# Patient Record
Sex: FEMALE | Race: BLACK OR AFRICAN AMERICAN | Employment: UNEMPLOYED | URBAN - METROPOLITAN AREA
[De-identification: names, ages, dates, MRNs, and addresses within clinical notes are randomized per-mention and may not be internally consistent; named-entity substitution may affect disease eponyms.]

---

## 2017-01-13 ENCOUNTER — ED HISTORICAL/CONVERTED ENCOUNTER (OUTPATIENT)
Dept: OTHER | Age: 32
End: 2017-01-13

## 2017-08-19 ENCOUNTER — ED HISTORICAL/CONVERTED ENCOUNTER (OUTPATIENT)
Dept: OTHER | Age: 32
End: 2017-08-19

## 2019-05-29 LAB — PAP SMEAR, EXTERNAL: NEGATIVE

## 2019-06-11 ENCOUNTER — OP HISTORICAL/CONVERTED ENCOUNTER (OUTPATIENT)
Dept: OTHER | Age: 34
End: 2019-06-11

## 2019-11-20 ENCOUNTER — OP HISTORICAL/CONVERTED ENCOUNTER (OUTPATIENT)
Dept: OTHER | Age: 34
End: 2019-11-20

## 2019-12-10 ENCOUNTER — ED HISTORICAL/CONVERTED ENCOUNTER (OUTPATIENT)
Dept: OTHER | Age: 34
End: 2019-12-10

## 2019-12-26 ENCOUNTER — IP HISTORICAL/CONVERTED ENCOUNTER (OUTPATIENT)
Dept: OTHER | Age: 34
End: 2019-12-26

## 2020-01-02 ENCOUNTER — OP HISTORICAL/CONVERTED ENCOUNTER (OUTPATIENT)
Dept: OTHER | Age: 35
End: 2020-01-02

## 2020-12-07 VITALS
HEIGHT: 71 IN | DIASTOLIC BLOOD PRESSURE: 96 MMHG | HEART RATE: 80 BPM | BODY MASS INDEX: 41.02 KG/M2 | WEIGHT: 293 LBS | SYSTOLIC BLOOD PRESSURE: 160 MMHG

## 2020-12-07 PROBLEM — A49.8 BACTERIAL INFECTION DUE TO PROTEUS MIRABILIS: Status: ACTIVE | Noted: 2020-12-07

## 2020-12-07 PROBLEM — R51.9 HEADACHE: Status: ACTIVE | Noted: 2020-12-07

## 2020-12-07 PROBLEM — Z98.891 HISTORY OF CESAREAN SECTION: Status: ACTIVE | Noted: 2020-12-07

## 2020-12-07 PROBLEM — I10 BENIGN ESSENTIAL HYPERTENSION: Status: ACTIVE | Noted: 2020-12-07

## 2020-12-07 PROBLEM — G43.909 MIGRAINE: Status: ACTIVE | Noted: 2020-12-07

## 2020-12-07 PROBLEM — E66.01 MORBID OBESITY (HCC): Status: ACTIVE | Noted: 2020-12-07

## 2020-12-07 PROBLEM — A49.9 BACTERIAL INFECTIOUS DISEASE: Status: ACTIVE | Noted: 2020-12-07

## 2020-12-07 PROBLEM — G89.29 CHRONIC BACK PAIN: Status: ACTIVE | Noted: 2020-12-07

## 2020-12-07 PROBLEM — F17.200 TOBACCO DEPENDENCE SYNDROME: Status: ACTIVE | Noted: 2020-12-07

## 2020-12-07 PROBLEM — Z72.0 TOBACCO USER: Status: ACTIVE | Noted: 2020-12-07

## 2020-12-07 PROBLEM — M54.9 CHRONIC BACK PAIN: Status: ACTIVE | Noted: 2020-12-07

## 2020-12-07 PROBLEM — I15.2 HYPERTENSION DUE TO ENDOCRINE DISORDER: Status: ACTIVE | Noted: 2020-12-07

## 2020-12-07 PROBLEM — M54.50 LOW BACK PAIN: Status: ACTIVE | Noted: 2020-12-07

## 2020-12-07 PROBLEM — L03.90 CELLULITIS: Status: ACTIVE | Noted: 2020-12-07

## 2020-12-07 PROBLEM — B37.31 CANDIDIASIS OF VAGINA: Status: ACTIVE | Noted: 2020-12-07

## 2020-12-07 PROBLEM — J32.9 SINUSITIS: Status: ACTIVE | Noted: 2020-12-07

## 2020-12-07 PROBLEM — T81.32XA DEHISCENCE OF INTERNAL SURGICAL WOUND: Status: ACTIVE | Noted: 2020-12-07

## 2020-12-07 PROBLEM — R25.2 CRAMP AND SPASM: Status: ACTIVE | Noted: 2020-12-07

## 2020-12-07 PROBLEM — T81.40XA POSTOPERATIVE INFECTION: Status: ACTIVE | Noted: 2020-12-07

## 2020-12-07 PROBLEM — A49.8 INFECTION DUE TO ESCHERICHIA COLI: Status: ACTIVE | Noted: 2020-12-07

## 2020-12-07 PROBLEM — G56.03 CARPAL TUNNEL SYNDROME, BILATERAL: Status: ACTIVE | Noted: 2020-12-07

## 2020-12-07 RX ORDER — IBUPROFEN 800 MG/1
TABLET ORAL
COMMUNITY

## 2020-12-07 RX ORDER — FLUCONAZOLE 150 MG/1
150 TABLET ORAL DAILY
COMMUNITY
End: 2021-05-03 | Stop reason: SDDI

## 2020-12-07 RX ORDER — CETIRIZINE HCL 10 MG
TABLET ORAL
COMMUNITY

## 2020-12-07 RX ORDER — B-COMPLEX WITH VITAMIN C
1 TABLET ORAL DAILY
COMMUNITY
End: 2021-05-03 | Stop reason: SDDI

## 2020-12-07 RX ORDER — ASPIRIN 81 MG/1
TABLET ORAL DAILY
COMMUNITY
End: 2021-05-03 | Stop reason: SDDI

## 2020-12-07 RX ORDER — TELMISARTAN 40 MG/1
40 TABLET ORAL DAILY
COMMUNITY
End: 2021-05-03 | Stop reason: SDDI

## 2020-12-07 RX ORDER — DOCUSATE SODIUM 100 MG/1
100 CAPSULE, LIQUID FILLED ORAL 2 TIMES DAILY
COMMUNITY

## 2020-12-07 RX ORDER — AMOXICILLIN 500 MG/1
500 CAPSULE ORAL
COMMUNITY
End: 2021-05-03 | Stop reason: ALTCHOICE

## 2020-12-07 RX ORDER — FLUTICASONE PROPIONATE 50 MCG
2 SPRAY, SUSPENSION (ML) NASAL DAILY
COMMUNITY
End: 2021-05-03 | Stop reason: ALTCHOICE

## 2020-12-07 RX ORDER — TELMISARTAN AND HYDROCHLORTHIAZIDE 40; 12.5 MG/1; MG/1
1 TABLET ORAL DAILY
COMMUNITY
End: 2021-05-03 | Stop reason: SDDI

## 2020-12-07 RX ORDER — SULFAMETHOXAZOLE AND TRIMETHOPRIM 800; 160 MG/1; MG/1
1 TABLET ORAL 2 TIMES DAILY
COMMUNITY
End: 2021-05-03 | Stop reason: ALTCHOICE

## 2020-12-07 RX ORDER — LANOLIN ALCOHOL/MO/W.PET/CERES
CREAM (GRAM) TOPICAL
COMMUNITY
End: 2021-05-03 | Stop reason: SDDI

## 2020-12-07 RX ORDER — PROMETHAZINE HYDROCHLORIDE 12.5 MG/1
TABLET ORAL
COMMUNITY
End: 2021-05-03 | Stop reason: SDDI

## 2020-12-07 RX ORDER — AMOXICILLIN AND CLAVULANATE POTASSIUM 875; 125 MG/1; MG/1
TABLET, FILM COATED ORAL EVERY 12 HOURS
COMMUNITY
End: 2021-05-03 | Stop reason: ALTCHOICE

## 2020-12-07 RX ORDER — NIFEDIPINE 30 MG/1
TABLET, EXTENDED RELEASE ORAL DAILY
COMMUNITY
End: 2021-05-03 | Stop reason: SDDI

## 2020-12-07 RX ORDER — LOSARTAN POTASSIUM AND HYDROCHLOROTHIAZIDE 12.5; 1 MG/1; MG/1
1 TABLET ORAL DAILY
COMMUNITY

## 2020-12-07 RX ORDER — SODIUM CHLORIDE 0.65 %
1 AEROSOL, SPRAY (ML) NASAL AS NEEDED
COMMUNITY
End: 2021-05-03 | Stop reason: SDDI

## 2020-12-07 RX ORDER — TRIAMCINOLONE ACETONIDE 1 MG/G
CREAM TOPICAL 2 TIMES DAILY
COMMUNITY

## 2020-12-07 RX ORDER — HYDROCHLOROTHIAZIDE 12.5 MG/1
12.5 CAPSULE ORAL DAILY
COMMUNITY
End: 2021-05-03 | Stop reason: SDDI

## 2021-05-03 ENCOUNTER — VIRTUAL VISIT (OUTPATIENT)
Dept: FAMILY MEDICINE CLINIC | Age: 36
End: 2021-05-03
Payer: MEDICAID

## 2021-05-03 DIAGNOSIS — Z20.822 ENCOUNTER BY TELEHEALTH FOR SUSPECTED COVID-19: Primary | ICD-10-CM

## 2021-05-03 DIAGNOSIS — R11.2 NON-INTRACTABLE VOMITING WITH NAUSEA, UNSPECIFIED VOMITING TYPE: ICD-10-CM

## 2021-05-03 PROBLEM — B37.31 CANDIDIASIS OF VAGINA: Status: RESOLVED | Noted: 2020-12-07 | Resolved: 2021-05-03

## 2021-05-03 PROBLEM — A49.8 BACTERIAL INFECTION DUE TO PROTEUS MIRABILIS: Status: RESOLVED | Noted: 2020-12-07 | Resolved: 2021-05-03

## 2021-05-03 PROCEDURE — 99214 OFFICE O/P EST MOD 30 MIN: CPT | Performed by: NURSE PRACTITIONER

## 2021-05-03 RX ORDER — ONDANSETRON 4 MG/1
4 TABLET, ORALLY DISINTEGRATING ORAL
Qty: 21 TAB | Refills: 0 | Status: SHIPPED | OUTPATIENT
Start: 2021-05-03 | End: 2021-05-10

## 2021-05-03 NOTE — PROGRESS NOTES
Vidal Stewart (: 1985) is a 38 y.o. female, established patient, here for evaluation of the following chief complaint(s):   Chills, Fever, Nausea, Vomiting, and Generalized Body Aches       ASSESSMENT/PLAN:  Below is the assessment and plan developed based on review of pertinent labs, studies, and medications. 1. Encounter by telehealth for suspected COVID-19    No follow-ups on file. 1. Encounter by telehealth for suspected COVID-19  Patient advised that even if COVID-19 testing comes back negative, based on her symptoms she should continue to quarantine at home and consider retesting in a day or 2. She will need to have 3 consecutive days without a fever without use of medication also to come out of quarantine. Advised to go to the emergency room ASAP if her shortness of breath increases. Advised to get as much rest as possible take Tylenol Extra Strength or ibuprofen for aches and pains and stay well-hydrated. She should stay away as much as possible for the rest of the family members and wear a mask in the home and so should her other family members if they are near each other. SUBJECTIVE/OBJECTIVE:    HPI:     Patient presented with complaints of shortness of breath, chills, fever of 100.7, nausea, vomiting, body aches, and terrible headache. Patient stated she was tested for COVID-19 at [facility] today and expects to hear the results in 1 to 2 days. She stated that the symptoms came on suddenly on Saturday, 2021 when she had an event for her father and there were many people attending. She is not aware of anyone attending that might have been positive for COVID-19. She lives with her fiancé and 2 children. Her fiancé was also at the event for her father on 2021 and was also tested for Covid today. Review of Systems   Constitutional: Positive for chills and fever. HENT: Negative for congestion, ear pain, postnasal drip, sinus pressure and sinus pain.     Eyes: Negative for pain and itching. Respiratory: Positive for shortness of breath. Negative for cough and wheezing. Cardiovascular: Negative for chest pain and palpitations. Gastrointestinal: Positive for nausea and vomiting. Negative for abdominal pain and diarrhea. Genitourinary: Negative for dysuria and frequency. Musculoskeletal: Positive for myalgias. Negative for arthralgias. Skin: Negative for rash. Neurological: Positive for headaches. Negative for dizziness, weakness, light-headedness and numbness. Psychiatric/Behavioral: Negative for dysphoric mood and sleep disturbance. The patient is not nervous/anxious. No flowsheet data found. Physical Exam  Constitutional:       General: She is not in acute distress. Appearance: Normal appearance. She is obese. She is ill-appearing. HENT:      Head: Normocephalic. Right Ear: External ear normal.      Left Ear: External ear normal.      Nose: Nose normal.   Eyes:      Conjunctiva/sclera: Conjunctivae normal.   Neck:      Musculoskeletal: Neck supple. Pulmonary:      Effort: Pulmonary effort is normal. No respiratory distress. Breath sounds: No wheezing. Musculoskeletal: Normal range of motion. Comments: Of visible extremities. Skin:     Coloration: Skin is not jaundiced. Findings: No rash. Neurological:      Mental Status: She is alert and oriented to person, place, and time. Psychiatric:         Mood and Affect: Mood normal.         Behavior: Behavior normal.         Thought Content: Thought content normal.         Judgment: Judgment normal.       Irnea Vazquez was evaluated through a synchronous (real-time) audio-video encounter. The patient (or guardian if applicable) is aware that this is a billable service. Verbal consent to proceed has been obtained within the past 12 months.  The visit was conducted pursuant to the emergency declaration under the [facility], [facility] waiver authority and the [employer] and [employer] General Act. Patient identification was verified, and a caregiver was present when appropriate. The patient was located in a state where the provider was credentialed to provide care. An electronic signature was used to authenticate this note.   -- Carlitos Alvarado NP

## 2021-07-12 ENCOUNTER — VIRTUAL VISIT (OUTPATIENT)
Dept: FAMILY MEDICINE CLINIC | Age: 36
End: 2021-07-12

## 2021-07-12 NOTE — PROGRESS NOTES
Ethan Cespedes (: 1985) is a 38 y.o. female, established patient, here for evaluation of the following chief complaint(s):   Headache, Vomiting, and Diarrhea       ASSESSMENT/PLAN:  Below is the assessment and plan developed based on review of pertinent labs, studies, and medications. {There are no diagnoses linked to this encounter. (Refresh or delete this SmartLink)}    No follow-ups on file. SUBJECTIVE/OBJECTIVE:    HPI:      Review of Systems     No flowsheet data found. Physical Exam          {Time Documentation Optional:24065}    Ethan Cespedes, was evaluated through a synchronous (real-time) audio-video encounter. The patient (or guardian if applicable) is aware that this is a billable service. Verbal consent to proceed has been obtained within the past 12 months. The visit was conducted pursuant to the emergency declaration under the [facility], [address] [place] [address] [facility] authority and the [employer] General Act. Patient identification was verified, and a caregiver was present when appropriate. The patient was located in a state where the provider was credentialed to provide care. An electronic signature was used to authenticate this note.   -- Patricia Vela NP

## 2021-11-02 ENCOUNTER — TRANSCRIBE ORDER (OUTPATIENT)
Dept: SCHEDULING | Age: 36
End: 2021-11-02

## 2021-11-02 DIAGNOSIS — R29.6 REPEATED FALLS: Primary | ICD-10-CM

## 2021-11-03 ENCOUNTER — TRANSCRIBE ORDER (OUTPATIENT)
Dept: SCHEDULING | Age: 36
End: 2021-11-03

## 2022-01-31 ENCOUNTER — TELEPHONE (OUTPATIENT)
Dept: FAMILY MEDICINE CLINIC | Age: 37
End: 2022-01-31

## 2022-01-31 NOTE — TELEPHONE ENCOUNTER
Patient called today to say she tested positive for Covid and would like to know what should she do she would like to know if she can get something over the counter for this

## 2022-03-18 PROBLEM — J32.9 SINUSITIS: Status: ACTIVE | Noted: 2020-12-07

## 2022-03-18 PROBLEM — A49.9 BACTERIAL INFECTIOUS DISEASE: Status: ACTIVE | Noted: 2020-12-07

## 2022-03-18 PROBLEM — T81.40XA POSTOPERATIVE INFECTION: Status: ACTIVE | Noted: 2020-12-07

## 2022-03-19 PROBLEM — M54.9 CHRONIC BACK PAIN: Status: ACTIVE | Noted: 2020-12-07

## 2022-03-19 PROBLEM — A49.8 INFECTION DUE TO ESCHERICHIA COLI: Status: ACTIVE | Noted: 2020-12-07

## 2022-03-19 PROBLEM — G89.29 CHRONIC BACK PAIN: Status: ACTIVE | Noted: 2020-12-07

## 2022-03-19 PROBLEM — R51.9 HEADACHE: Status: ACTIVE | Noted: 2020-12-07

## 2022-03-19 PROBLEM — M54.50 LOW BACK PAIN: Status: ACTIVE | Noted: 2020-12-07

## 2022-03-19 PROBLEM — F17.200 TOBACCO DEPENDENCE SYNDROME: Status: ACTIVE | Noted: 2020-12-07

## 2022-03-19 PROBLEM — L03.90 CELLULITIS: Status: ACTIVE | Noted: 2020-12-07

## 2022-03-19 PROBLEM — G56.03 CARPAL TUNNEL SYNDROME, BILATERAL: Status: ACTIVE | Noted: 2020-12-07

## 2022-03-19 PROBLEM — Z20.822 ENCOUNTER BY TELEHEALTH FOR SUSPECTED COVID-19: Status: ACTIVE | Noted: 2021-05-03

## 2022-03-19 PROBLEM — Z72.0 TOBACCO USER: Status: ACTIVE | Noted: 2020-12-07

## 2022-03-20 PROBLEM — E66.01 MORBID OBESITY (HCC): Status: ACTIVE | Noted: 2020-12-07

## 2022-03-20 PROBLEM — G43.909 MIGRAINE: Status: ACTIVE | Noted: 2020-12-07

## 2022-03-20 PROBLEM — Z98.891 HISTORY OF CESAREAN SECTION: Status: ACTIVE | Noted: 2020-12-07

## 2022-03-20 PROBLEM — R25.2 CRAMP AND SPASM: Status: ACTIVE | Noted: 2020-12-07

## 2022-03-20 PROBLEM — I10 ESSENTIAL HYPERTENSION: Status: ACTIVE | Noted: 2020-12-07

## 2022-03-20 PROBLEM — T81.32XA DEHISCENCE OF INTERNAL SURGICAL WOUND: Status: ACTIVE | Noted: 2020-12-07

## 2022-03-20 PROBLEM — I15.2 HYPERTENSION DUE TO ENDOCRINE DISORDER: Status: ACTIVE | Noted: 2020-12-07

## 2023-05-19 RX ORDER — PSEUDOEPHEDRINE HCL 30 MG
100 TABLET ORAL 2 TIMES DAILY
COMMUNITY

## 2023-05-19 RX ORDER — LOSARTAN POTASSIUM AND HYDROCHLOROTHIAZIDE 12.5; 1 MG/1; MG/1
1 TABLET ORAL DAILY
COMMUNITY

## 2023-05-19 RX ORDER — TRIAMCINOLONE ACETONIDE 1 MG/G
CREAM TOPICAL 2 TIMES DAILY
COMMUNITY

## 2023-05-19 RX ORDER — CETIRIZINE HYDROCHLORIDE 10 MG/1
TABLET ORAL
COMMUNITY

## 2023-05-19 RX ORDER — IBUPROFEN 800 MG/1
TABLET ORAL
COMMUNITY

## 2024-05-27 ENCOUNTER — HOSPITAL ENCOUNTER (EMERGENCY)
Facility: HOSPITAL | Age: 39
Discharge: HOME | End: 2024-05-27
Attending: STUDENT IN AN ORGANIZED HEALTH CARE EDUCATION/TRAINING PROGRAM
Payer: MEDICAID

## 2024-05-27 ENCOUNTER — APPOINTMENT (OUTPATIENT)
Facility: HOSPITAL | Age: 39
End: 2024-05-27
Payer: MEDICAID

## 2024-05-27 VITALS
HEART RATE: 78 BPM | OXYGEN SATURATION: 95 % | HEIGHT: 72 IN | DIASTOLIC BLOOD PRESSURE: 86 MMHG | WEIGHT: 293 LBS | RESPIRATION RATE: 18 BRPM | SYSTOLIC BLOOD PRESSURE: 123 MMHG | BODY MASS INDEX: 39.68 KG/M2 | TEMPERATURE: 98.3 F

## 2024-05-27 DIAGNOSIS — N61.1 BREAST ABSCESS: Primary | ICD-10-CM

## 2024-05-27 LAB
ANION GAP SERPL CALC-SCNC: 4 MMOL/L (ref 5–15)
BUN SERPL-MCNC: 9 MG/DL (ref 6–20)
BUN/CREAT SERPL: 13 (ref 12–20)
CA-I BLD-MCNC: 9 MG/DL (ref 8.5–10.1)
CHLORIDE SERPL-SCNC: 109 MMOL/L (ref 97–108)
CO2 SERPL-SCNC: 28 MMOL/L (ref 21–32)
CREAT SERPL-MCNC: 0.72 MG/DL (ref 0.55–1.02)
ERYTHROCYTE [DISTWIDTH] IN BLOOD BY AUTOMATED COUNT: 16.8 % (ref 11.5–14.5)
GLUCOSE SERPL-MCNC: 104 MG/DL (ref 65–100)
HCT VFR BLD AUTO: 33.7 % (ref 35–47)
HGB BLD-MCNC: 10.4 G/DL (ref 11.5–16)
MCH RBC QN AUTO: 22.8 PG (ref 26–34)
MCHC RBC AUTO-ENTMCNC: 30.9 G/DL (ref 30–36.5)
MCV RBC AUTO: 73.9 FL (ref 80–99)
NRBC # BLD: 0 K/UL (ref 0–0.01)
NRBC BLD-RTO: 0 PER 100 WBC
PLATELET # BLD AUTO: 390 K/UL (ref 150–400)
PMV BLD AUTO: 10.6 FL (ref 8.9–12.9)
POTASSIUM SERPL-SCNC: 4.1 MMOL/L (ref 3.5–5.1)
RBC # BLD AUTO: 4.56 M/UL (ref 3.8–5.2)
SODIUM SERPL-SCNC: 141 MMOL/L (ref 136–145)
WBC # BLD AUTO: 9.7 K/UL (ref 3.6–11)

## 2024-05-27 PROCEDURE — 85027 COMPLETE CBC AUTOMATED: CPT

## 2024-05-27 PROCEDURE — 99284 EMERGENCY DEPT VISIT MOD MDM: CPT

## 2024-05-27 PROCEDURE — 6370000000 HC RX 637 (ALT 250 FOR IP)

## 2024-05-27 PROCEDURE — 36415 COLL VENOUS BLD VENIPUNCTURE: CPT

## 2024-05-27 PROCEDURE — 80048 BASIC METABOLIC PNL TOTAL CA: CPT

## 2024-05-27 PROCEDURE — 76604 US EXAM CHEST: CPT

## 2024-05-27 PROCEDURE — 10060 I&D ABSCESS SIMPLE/SINGLE: CPT | Performed by: SURGERY

## 2024-05-27 PROCEDURE — 10061 I&D ABSCESS COMP/MULTIPLE: CPT

## 2024-05-27 RX ORDER — HYDROCODONE BITARTRATE AND ACETAMINOPHEN 5; 325 MG/1; MG/1
1 TABLET ORAL EVERY 6 HOURS PRN
Qty: 12 TABLET | Refills: 0 | Status: SHIPPED | OUTPATIENT
Start: 2024-05-27 | End: 2024-05-30

## 2024-05-27 RX ORDER — AMOXICILLIN AND CLAVULANATE POTASSIUM 875; 125 MG/1; MG/1
1 TABLET, FILM COATED ORAL 2 TIMES DAILY
Qty: 14 TABLET | Refills: 0 | Status: SHIPPED | OUTPATIENT
Start: 2024-05-27 | End: 2024-06-03

## 2024-05-27 RX ORDER — HYDROCODONE BITARTRATE AND ACETAMINOPHEN 5; 325 MG/1; MG/1
1 TABLET ORAL
Status: COMPLETED | OUTPATIENT
Start: 2024-05-27 | End: 2024-05-27

## 2024-05-27 RX ADMIN — HYDROCODONE BITARTRATE AND ACETAMINOPHEN 1 TABLET: 5; 325 TABLET ORAL at 12:35

## 2024-05-27 ASSESSMENT — PAIN - FUNCTIONAL ASSESSMENT
PAIN_FUNCTIONAL_ASSESSMENT: 0-10
PAIN_FUNCTIONAL_ASSESSMENT: 0-10

## 2024-05-27 ASSESSMENT — PAIN SCALES - GENERAL
PAINLEVEL_OUTOF10: 10
PAINLEVEL_OUTOF10: 10
PAINLEVEL_OUTOF10: 8

## 2024-05-27 ASSESSMENT — LIFESTYLE VARIABLES
HOW MANY STANDARD DRINKS CONTAINING ALCOHOL DO YOU HAVE ON A TYPICAL DAY: PATIENT DOES NOT DRINK
HOW OFTEN DO YOU HAVE A DRINK CONTAINING ALCOHOL: NEVER

## 2024-05-27 ASSESSMENT — PAIN DESCRIPTION - ORIENTATION: ORIENTATION: RIGHT

## 2024-05-27 ASSESSMENT — PAIN DESCRIPTION - LOCATION: LOCATION: BREAST

## 2024-05-27 NOTE — DISCHARGE INSTRUCTIONS
Thank you for choosing our Emergency Department for your care.  It is our privilege to care for you in your time of need.  In the next several days, you may receive a survey via email or mailed to your home about your experience with our team.  We would greatly appreciate you taking a few minutes to complete the survey, as we use this information to learn what we have done well and what we could be doing better. Thank you for trusting us with your care!    Below you will find a list of your tests from today's visit.   Labs  Recent Results (from the past 12 hour(s))   CBC    Collection Time: 05/27/24  2:38 PM   Result Value Ref Range    WBC 9.7 3.6 - 11.0 K/uL    RBC 4.56 3.80 - 5.20 M/uL    Hemoglobin 10.4 (L) 11.5 - 16.0 g/dL    Hematocrit 33.7 (L) 35.0 - 47.0 %    MCV 73.9 (L) 80.0 - 99.0 FL    MCH 22.8 (L) 26.0 - 34.0 PG    MCHC 30.9 30.0 - 36.5 g/dL    RDW 16.8 (H) 11.5 - 14.5 %    Platelets 390 150 - 400 K/uL    MPV 10.6 8.9 - 12.9 FL    Nucleated RBCs 0.0 0.0  WBC    nRBC 0.00 0.00 - 0.01 K/uL   Basic Metabolic Panel    Collection Time: 05/27/24  2:38 PM   Result Value Ref Range    Sodium 141 136 - 145 mmol/L    Potassium 4.1 3.5 - 5.1 mmol/L    Chloride 109 (H) 97 - 108 mmol/L    CO2 28 21 - 32 mmol/L    Anion Gap 4 (L) 5 - 15 mmol/L    Glucose 104 (H) 65 - 100 mg/dL    BUN 9 6 - 20 mg/dL    Creatinine 0.72 0.55 - 1.02 mg/dL    BUN/Creatinine Ratio 13 12 - 20      Est, Glom Filt Rate >90 >60 ml/min/1.73m2    Calcium 9.0 8.5 - 10.1 mg/dL       Radiologic Studies  US CHEST INCLUDING MEDIASTINUM   Final Result   Large complex collection at the site of palpable abnormality in the right chest   wall.                                      ------------------------------------------------------------------------------------------------------------  The evaluation and treatment you received in the Emergency Department were for an urgent problem. It is important that you follow-up with a doctor, nurse

## 2024-05-27 NOTE — ED TRIAGE NOTES
Patient states she has a chronic boil on right breast, was advised to follow up with a breast specialist years ago but never did. Patient states abscess flared up 7 days ago, complains of 10/10 pain

## 2024-05-27 NOTE — PROCEDURES
PROCEDURE NOTE  Date: 5/27/2024   Name: Deena Mullen  YOB: 1985    Procedures   After induction of local anesthesia with 1% lidocaine, the right breast was prepped and draped in the usual sterile fashion.  Patient had an erythematous and fluctuant mass in the  6 o'clock position near the nipple areolar complex, Incision was made over the area of fluctuance and this was extended deep into  to a large abscess cavity.  a large  amount of purulent material material was drained from the abscess.  The abscess cavity was probed to lyse all loculations and the cavity was irrigated with betadine.  Hemostasis was assured and the wound was then packed open using gauze. Gauze dressings applied.    Patient instructed to remove packing in am and do simple dressing changes. She may shower in 48 hours and follow up with our clinic in 7 days. PO Augmentin for one week and pain meds will be prescribed by ER team.     Vamsi Hoff MD, FACS

## 2024-05-27 NOTE — ED PROVIDER NOTES
collection at the site of palpable abnormality in the right chest   wall.                                         ED COURSE and DIFFERENTIAL DIAGNOSIS/MDM   4:15 PM Differential and Considerations: 38-year-old female presents with right breast pain.  Differential diagnosis includes mastitis, cellulitis, abscess, septicemia. On exam, she is not in any acute distress. Vital signs are stable and she is afebrile. There is significant erythema, warmth, and swelling of the right breast, consistent with an abscess. Ultrasound shows a heterogeneous, complex collection measuring 6.0 x 2.5 x 5.8 cm. I consulted general surgeon Dr. Hoff who drained the abscess at the bedside. He is recommending following up with general surgery in 1 week. He is also recommending Augmentin x 7 days. Will send Rx to patient's pharmacy and provide referral to general surgeon. Patient has been instructed to remove the packing in 24 hours and continue cleaning the site and keeping the area dry and clean. Patient understands and agrees to the plan. Vital signs are stable and she is safe for discharge at this time.     Records Reviewed (source and summary of external notes): Prior medical records and Nursing notes.    Vitals:    Vitals:    05/27/24 1445 05/27/24 1500 05/27/24 1515 05/27/24 1611   BP:  126/82  123/86   Pulse:    78   Resp:    18   Temp:    98.3 °F (36.8 °C)   TempSrc:    Oral   SpO2: 99% 96% 100% 95%   Weight:       Height:            ED COURSE  ED Course as of 05/27/24 1615   Mon May 27, 2024   1242 39 y/o F presents to the ED with right breast pain.  She has a history of breast abscesses in the past.  She saw a general surgeon for this several years ago, and has had it drained several times.  She states that the symptoms have been present for the past week.  She denies any fever/chills.  She rates her pain as 10 out of 10. [MM]   1243 I have discussed this case with ED attending Dr. Soni who also saw the patient himself.  I

## 2024-06-13 ENCOUNTER — OFFICE VISIT (OUTPATIENT)
Age: 39
End: 2024-06-13
Payer: MEDICAID

## 2024-06-13 VITALS
SYSTOLIC BLOOD PRESSURE: 157 MMHG | TEMPERATURE: 98.5 F | OXYGEN SATURATION: 97 % | RESPIRATION RATE: 18 BRPM | HEIGHT: 72 IN | DIASTOLIC BLOOD PRESSURE: 94 MMHG | WEIGHT: 293 LBS | BODY MASS INDEX: 39.68 KG/M2 | HEART RATE: 91 BPM

## 2024-06-13 DIAGNOSIS — N61.1 BREAST ABSCESS: Primary | ICD-10-CM

## 2024-06-13 PROCEDURE — 3077F SYST BP >= 140 MM HG: CPT | Performed by: SURGERY

## 2024-06-13 PROCEDURE — 99203 OFFICE O/P NEW LOW 30 MIN: CPT | Performed by: SURGERY

## 2024-06-13 PROCEDURE — 3080F DIAST BP >= 90 MM HG: CPT | Performed by: SURGERY

## 2024-06-13 ASSESSMENT — PATIENT HEALTH QUESTIONNAIRE - PHQ9
SUM OF ALL RESPONSES TO PHQ QUESTIONS 1-9: 0
SUM OF ALL RESPONSES TO PHQ QUESTIONS 1-9: 0
SUM OF ALL RESPONSES TO PHQ9 QUESTIONS 1 & 2: 0
1. LITTLE INTEREST OR PLEASURE IN DOING THINGS: NOT AT ALL
SUM OF ALL RESPONSES TO PHQ QUESTIONS 1-9: 0
2. FEELING DOWN, DEPRESSED OR HOPELESS: NOT AT ALL
SUM OF ALL RESPONSES TO PHQ QUESTIONS 1-9: 0

## 2024-06-14 ASSESSMENT — ENCOUNTER SYMPTOMS
VOMITING: 0
SORE THROAT: 0
EYE REDNESS: 0
COUGH: 0
ABDOMINAL PAIN: 0
SHORTNESS OF BREATH: 0
BACK PAIN: 0
WHEEZING: 0
NAUSEA: 0

## 2024-06-25 ENCOUNTER — TELEPHONE (OUTPATIENT)
Age: 39
End: 2024-06-25

## 2024-06-25 NOTE — TELEPHONE ENCOUNTER
Called the breast clinic to see if the patient was scheduled yet they stated she was in the system but not yet scheduled for an appointment, transferred me to the Doctor assistant and I left a message on her machine.